# Patient Record
Sex: MALE | URBAN - METROPOLITAN AREA
[De-identification: names, ages, dates, MRNs, and addresses within clinical notes are randomized per-mention and may not be internally consistent; named-entity substitution may affect disease eponyms.]

---

## 2017-12-19 RX ORDER — LISINOPRIL 10 MG/1
10 TABLET ORAL DAILY
Qty: 90 TABLET | Refills: 0 | Status: SHIPPED | OUTPATIENT
Start: 2017-12-19 | End: 2018-01-23

## 2017-12-19 NOTE — TELEPHONE ENCOUNTER
Hypertensive Medications  Protocol Criteria:  · Appointment scheduled in the past 6 months or in the next 3 months  · BMP or CMP in the past 12 months  · Creatinine result < 2  Recent Outpatient Visits            1 year ago Flank strain, initial encounter

## 2017-12-19 NOTE — TELEPHONE ENCOUNTER
Spouse calling for refill request. Indicated patient at work but will have patient call back to schedule an appointment. Please advise on refill request. Any lab orders?

## 2018-01-23 ENCOUNTER — OFFICE VISIT (OUTPATIENT)
Dept: INTERNAL MEDICINE CLINIC | Facility: CLINIC | Age: 58
End: 2018-01-23

## 2018-01-23 VITALS
DIASTOLIC BLOOD PRESSURE: 84 MMHG | WEIGHT: 245 LBS | HEIGHT: 74 IN | SYSTOLIC BLOOD PRESSURE: 134 MMHG | BODY MASS INDEX: 31.44 KG/M2 | HEART RATE: 78 BPM

## 2018-01-23 DIAGNOSIS — I10 ESSENTIAL HYPERTENSION WITH GOAL BLOOD PRESSURE LESS THAN 140/90: ICD-10-CM

## 2018-01-23 DIAGNOSIS — Z00.00 ANNUAL PHYSICAL EXAM: Primary | ICD-10-CM

## 2018-01-23 PROCEDURE — 99396 PREV VISIT EST AGE 40-64: CPT | Performed by: INTERNAL MEDICINE

## 2018-01-23 RX ORDER — LISINOPRIL 10 MG/1
10 TABLET ORAL DAILY
Qty: 90 TABLET | Refills: 3 | Status: SHIPPED | OUTPATIENT
Start: 2018-01-23 | End: 2019-01-24

## 2018-01-23 RX ORDER — LISINOPRIL 10 MG/1
10 TABLET ORAL DAILY
Qty: 30 TABLET | Refills: 0 | Status: SHIPPED | OUTPATIENT
Start: 2018-01-23 | End: 2018-01-23

## 2018-01-23 NOTE — PROGRESS NOTES
HPI:    Patient ID: Carrie Jiang is a 56year old male. Patient presents today for annual physical.       Hypertension   This is a chronic problem. The current episode started more than 1 year ago. The problem is controlled.  Pertinent negatives incl rate, regular rhythm, normal heart sounds and intact distal pulses. Exam reveals no gallop and no friction rub. No murmur heard. Pulmonary/Chest: Effort normal and breath sounds normal. No respiratory distress. He has no wheezes. He has no rales.    Ab

## 2019-01-25 RX ORDER — LISINOPRIL 10 MG/1
TABLET ORAL
Qty: 90 TABLET | Refills: 0 | Status: SHIPPED | OUTPATIENT
Start: 2019-01-25 | End: 2019-04-25

## 2019-04-25 RX ORDER — LISINOPRIL 10 MG/1
TABLET ORAL
Qty: 90 TABLET | Refills: 0 | Status: SHIPPED | OUTPATIENT
Start: 2019-04-25 | End: 2019-10-18

## 2019-07-24 RX ORDER — LISINOPRIL 10 MG/1
TABLET ORAL
Qty: 90 TABLET | Refills: 0 | OUTPATIENT
Start: 2019-07-24

## 2019-10-18 NOTE — TELEPHONE ENCOUNTER
Patient needs a refill on LISINOPRIL 10 MG Oral Tab. Patient is currently has 6 days left of the medication.  Patient is requesting for a 2 weeks worth at his local pharmacy [phone] and the rest will be from [address]

## 2019-10-19 RX ORDER — LISINOPRIL 10 MG/1
10 TABLET ORAL
Qty: 30 TABLET | Refills: 0 | Status: SHIPPED | OUTPATIENT
Start: 2019-10-19 | End: 2019-10-24

## 2019-10-19 RX ORDER — LISINOPRIL 10 MG/1
10 TABLET ORAL
Qty: 30 TABLET | Refills: 0 | Status: SHIPPED | OUTPATIENT
Start: 2019-10-19 | End: 2019-10-19

## 2019-10-19 NOTE — TELEPHONE ENCOUNTER
To reception staff, pls call pt for appt. Please review; protocol failed. Requested Prescriptions     Pending Prescriptions Disp Refills   • lisinopril 10 MG Oral Tab 90 tablet 0     Sig: Take 1 tablet (10 mg total) by mouth once daily.

## 2019-10-19 NOTE — TELEPHONE ENCOUNTER
Patient made appointment for 10/24 for a follow up.   Patient stated he only has 4-5 pills of his Blood Pressure medication    He would like a 30 day supply sent to [address] [place] [address],  [address]

## 2019-10-19 NOTE — TELEPHONE ENCOUNTER
Call pt; needs ov; last time seen more than a year ago; will only give 30 days; no further refills if no ov is done

## 2019-10-24 ENCOUNTER — OFFICE VISIT (OUTPATIENT)
Dept: INTERNAL MEDICINE CLINIC | Facility: CLINIC | Age: 59
End: 2019-10-24
Payer: COMMERCIAL

## 2019-10-24 VITALS
HEIGHT: 74 IN | HEART RATE: 75 BPM | WEIGHT: 235 LBS | SYSTOLIC BLOOD PRESSURE: 122 MMHG | BODY MASS INDEX: 30.16 KG/M2 | TEMPERATURE: 99 F | DIASTOLIC BLOOD PRESSURE: 76 MMHG

## 2019-10-24 DIAGNOSIS — Z00.00 ANNUAL PHYSICAL EXAM: Primary | ICD-10-CM

## 2019-10-24 DIAGNOSIS — Z87.19 HISTORY OF ULCERATIVE COLITIS: ICD-10-CM

## 2019-10-24 DIAGNOSIS — I10 ESSENTIAL HYPERTENSION: ICD-10-CM

## 2019-10-24 PROCEDURE — 99396 PREV VISIT EST AGE 40-64: CPT | Performed by: INTERNAL MEDICINE

## 2019-10-24 RX ORDER — LISINOPRIL 10 MG/1
10 TABLET ORAL
Qty: 90 TABLET | Refills: 1 | Status: SHIPPED | OUTPATIENT
Start: 2019-10-24 | End: 2020-01-16

## 2019-10-24 RX ORDER — LISINOPRIL 10 MG/1
10 TABLET ORAL
Qty: 90 TABLET | Refills: 1 | Status: SHIPPED | OUTPATIENT
Start: 2019-10-24 | End: 2019-10-24

## 2019-10-24 NOTE — PROGRESS NOTES
HPI:    Patient ID: Amy Philip is a 64year old male. Patient presents today for his physical.    Hypertension   This is a chronic problem. The current episode started more than 1 year ago. The problem has been gradually improving since onset.  The are normal. Right eye exhibits no discharge. Left eye exhibits no discharge. No scleral icterus. Neck: Normal range of motion. Neck supple. No JVD present. No thyromegaly present.    Cardiovascular: Normal rate, regular rhythm, normal heart sounds and int Referrals:  None       LB#[id number]

## 2020-01-16 NOTE — TELEPHONE ENCOUNTER
Current Outpatient Medications:   •  •  lisinopril 10 MG Oral Tab, Take 1 tablet (10 mg total) by mouth once daily. , Disp: 90 tablet, Rfl: 1

## 2020-01-17 RX ORDER — LISINOPRIL 10 MG/1
10 TABLET ORAL
Qty: 90 TABLET | Refills: 1 | Status: SHIPPED | OUTPATIENT
Start: 2020-01-17 | End: 2020-07-16

## 2020-01-17 NOTE — TELEPHONE ENCOUNTER
Please review; protocol failed. Requested Prescriptions     Pending Prescriptions Disp Refills   • lisinopril 10 MG Oral Tab 90 tablet 1     Sig: Take 1 tablet (10 mg total) by mouth once daily.          Recent Visits  Date Type Provider Dept   10/24/19

## 2020-07-16 RX ORDER — LISINOPRIL 10 MG/1
TABLET ORAL
Qty: 90 TABLET | Refills: 0 | Status: SHIPPED | OUTPATIENT
Start: 2020-07-16 | End: 2020-10-14

## 2020-07-17 NOTE — TELEPHONE ENCOUNTER
Left message for patient to call back. Please inform patient he is due for bp follow up and assist him with making appointment.

## 2020-07-24 NOTE — TELEPHONE ENCOUNTER
# 3 LMTCB Need to relate Dr. Alona Gomez message that patient is due for f/o HTN office visit. Office phone number given.

## 2020-10-14 RX ORDER — LISINOPRIL 10 MG/1
TABLET ORAL
Qty: 90 TABLET | Refills: 0 | Status: SHIPPED | OUTPATIENT
Start: 2020-10-14 | End: 2021-01-12

## 2021-01-12 RX ORDER — LISINOPRIL 10 MG/1
TABLET ORAL
Qty: 30 TABLET | Refills: 0 | Status: SHIPPED | OUTPATIENT
Start: 2021-01-12 | End: 2021-02-16

## 2021-01-12 NOTE — TELEPHONE ENCOUNTER
Please call patient he has not been seen here in the clinic for more than a year.   Can only given 30-day refill sent to his local pharmacy to tide him over

## 2021-01-19 RX ORDER — LISINOPRIL 10 MG/1
TABLET ORAL
Qty: 90 TABLET | Refills: 3 | OUTPATIENT
Start: 2021-01-19

## 2021-02-16 ENCOUNTER — TELEPHONE (OUTPATIENT)
Dept: INTERNAL MEDICINE CLINIC | Facility: CLINIC | Age: 61
End: 2021-02-16

## 2021-02-16 ENCOUNTER — OFFICE VISIT (OUTPATIENT)
Dept: INTERNAL MEDICINE CLINIC | Facility: CLINIC | Age: 61
End: 2021-02-16
Payer: COMMERCIAL

## 2021-02-16 VITALS
DIASTOLIC BLOOD PRESSURE: 92 MMHG | HEIGHT: 74.5 IN | WEIGHT: 253 LBS | BODY MASS INDEX: 32.13 KG/M2 | HEART RATE: 89 BPM | RESPIRATION RATE: 12 BRPM | SYSTOLIC BLOOD PRESSURE: 146 MMHG | TEMPERATURE: 97 F

## 2021-02-16 DIAGNOSIS — Z87.19 HISTORY OF ULCERATIVE COLITIS: ICD-10-CM

## 2021-02-16 DIAGNOSIS — I10 ESSENTIAL HYPERTENSION: Primary | ICD-10-CM

## 2021-02-16 DIAGNOSIS — Z13.220 SCREENING CHOLESTEROL LEVEL: ICD-10-CM

## 2021-02-16 DIAGNOSIS — Z12.5 PROSTATE CANCER SCREENING: ICD-10-CM

## 2021-02-16 PROCEDURE — 3008F BODY MASS INDEX DOCD: CPT | Performed by: INTERNAL MEDICINE

## 2021-02-16 PROCEDURE — 3080F DIAST BP >= 90 MM HG: CPT | Performed by: INTERNAL MEDICINE

## 2021-02-16 PROCEDURE — 3077F SYST BP >= 140 MM HG: CPT | Performed by: INTERNAL MEDICINE

## 2021-02-16 PROCEDURE — 99214 OFFICE O/P EST MOD 30 MIN: CPT | Performed by: INTERNAL MEDICINE

## 2021-02-16 RX ORDER — LISINOPRIL 20 MG/1
20 TABLET ORAL DAILY
Qty: 90 TABLET | Refills: 1 | Status: SHIPPED | OUTPATIENT
Start: 2021-02-16 | End: 2021-08-16

## 2021-02-16 NOTE — TELEPHONE ENCOUNTER
Please triage this patient coming in today with c/o severe headache. He self answered the screening questions. OV today at 4:15. Thank you.

## 2021-02-16 NOTE — PROGRESS NOTES
HPI:    Patient ID: Juhi Shepard is a 64year old male. Hypertension  This is a chronic problem. The current episode started more than 1 year ago. The problem has been gradually worsening since onset. The problem is uncontrolled.  Pertinent negatives drinks      Types: 14 - 16 Glasses of wine per week    Drug use: No       PHYSICAL EXAM:    Physical Exam   Constitutional: He is oriented to person, place, and time. He appears well-developed and well-nourished. No distress.    HENT:   Head: Normocephalic asymptomatic.   His last colonoscopy was 2 years ago with his GI and will be seeing his GI next month.    (Z12.5) Prostate cancer screening  Plan: PSA SCREEN        Check PSA.    (Z13.220) Screening cholesterol level  Plan: LIPID PANEL       We will check h

## 2021-08-16 RX ORDER — LISINOPRIL 20 MG/1
TABLET ORAL
Qty: 90 TABLET | Refills: 0 | Status: SHIPPED | OUTPATIENT
Start: 2021-08-16 | End: 2021-11-16

## 2021-08-17 NOTE — TELEPHONE ENCOUNTER
Spoke with pt, verified , informed pt that Dr Mekhi Luke refilled his medication and would like to see him back in the office for a follow up visit, pt verbalized understanding and stated he is currently at work and will call back to schedule an appt.

## 2021-11-16 RX ORDER — LISINOPRIL 20 MG/1
TABLET ORAL
Qty: 30 TABLET | Refills: 0 | Status: SHIPPED | OUTPATIENT
Start: 2021-11-16 | End: 2021-12-22

## 2021-11-19 NOTE — TELEPHONE ENCOUNTER
Spoke, with the patient and informed him of the message below. Pt states that he will call back to schedule an appointment.

## 2021-12-22 NOTE — TELEPHONE ENCOUNTER
LISINOPRIL 20 MG Oral Tab     Pt calling requesting refill for medication. States he will be out in a couple days so requesting 30 day be sent to local pharmacy. Pt also has appt scheduled with  for 12/30. Please advise.

## 2021-12-23 RX ORDER — LISINOPRIL 20 MG/1
20 TABLET ORAL DAILY
Qty: 30 TABLET | Refills: 0 | Status: SHIPPED | OUTPATIENT
Start: 2021-12-23 | End: 2021-12-30

## 2021-12-30 ENCOUNTER — OFFICE VISIT (OUTPATIENT)
Dept: INTERNAL MEDICINE CLINIC | Facility: CLINIC | Age: 61
End: 2021-12-30
Payer: COMMERCIAL

## 2021-12-30 VITALS
HEART RATE: 81 BPM | WEIGHT: 249.13 LBS | DIASTOLIC BLOOD PRESSURE: 80 MMHG | BODY MASS INDEX: 31.63 KG/M2 | SYSTOLIC BLOOD PRESSURE: 128 MMHG | OXYGEN SATURATION: 98 % | TEMPERATURE: 98 F | HEIGHT: 74.5 IN

## 2021-12-30 DIAGNOSIS — Z87.19 HISTORY OF ULCERATIVE COLITIS: ICD-10-CM

## 2021-12-30 DIAGNOSIS — Z12.5 PROSTATE CANCER SCREENING: ICD-10-CM

## 2021-12-30 DIAGNOSIS — Z00.00 ANNUAL PHYSICAL EXAM: Primary | ICD-10-CM

## 2021-12-30 DIAGNOSIS — I10 ESSENTIAL HYPERTENSION: ICD-10-CM

## 2021-12-30 PROCEDURE — 3008F BODY MASS INDEX DOCD: CPT | Performed by: INTERNAL MEDICINE

## 2021-12-30 PROCEDURE — 99396 PREV VISIT EST AGE 40-64: CPT | Performed by: INTERNAL MEDICINE

## 2021-12-30 PROCEDURE — 3074F SYST BP LT 130 MM HG: CPT | Performed by: INTERNAL MEDICINE

## 2021-12-30 PROCEDURE — 3079F DIAST BP 80-89 MM HG: CPT | Performed by: INTERNAL MEDICINE

## 2021-12-30 RX ORDER — LISINOPRIL 20 MG/1
20 TABLET ORAL DAILY
Qty: 90 TABLET | Refills: 1 | Status: SHIPPED | OUTPATIENT
Start: 2021-12-30 | End: 2022-01-29

## 2021-12-30 NOTE — PROGRESS NOTES
Subjective:     Patient ID: Brooke Pryor is a 63year old male.     Patient presents today for his annual physical. He has known history of hypertension and ulcerative colitis; currently has no complaints      History/Other:   Review of Systems   Consti Mouth/Throat:      Pharynx: No oropharyngeal exudate. Eyes:      General: No scleral icterus. Right eye: No discharge. Left eye: No discharge.       Conjunctiva/sclera: Conjunctivae normal.      Pupils: Pupils are equal, round, and reactive and  Schedule his colonoscopy.     (Z12.5) Prostate cancer screening  Plan: check psa. No orders of the defined types were placed in this encounter.       Meds This Visit:  Requested Prescriptions     Signed Prescriptions Disp Refills   • lisinopril

## 2022-01-29 RX ORDER — LISINOPRIL 20 MG/1
20 TABLET ORAL DAILY
Qty: 30 TABLET | Refills: 0 | Status: SHIPPED | OUTPATIENT
Start: 2022-01-29 | End: 2022-07-04

## 2022-01-29 NOTE — TELEPHONE ENCOUNTER
Refill passed per [facility] protocol.     Requested Prescriptions   Pending Prescriptions Disp Refills    LISINOPRIL 20 MG Oral Tab [Pharmacy Med Name: LISINOPRIL 20MG TABLETS] 30 tablet 0     Sig: TAKE 1 TABLET(20 MG) BY MOUTH DAILY        Hypertensive Medications Protocol Failed - 1/28/2022  5:07 PM        Failed - CMP or BMP in past 12 months        Failed - GFR Non- > 50     No results found for: [facility]              Passed - Appointment in past 6 or next 3 months                  Recent Outpatient Visits              1 month ago Annual physical exam    [address] Trammell MD    Office Visit    11 months ago Essential hypertension    [facility], [facility], Lex Lopez, Awilda Cordoba MD    Office Visit    2 years ago Annual physical exam    [facility], [facility], Awilda Carrillo MD    Office Visit    4 years ago Annual physical exam    [facility], [facility], Awilda Carrillo MD    Office Visit    5 years ago Flank strain, initial encounter    [facility], [facility], Awilda Carrillo MD    Office Visit

## 2022-07-04 RX ORDER — LISINOPRIL 20 MG/1
TABLET ORAL
Qty: 90 TABLET | Refills: 0 | Status: SHIPPED | OUTPATIENT
Start: 2022-07-04 | End: 2022-10-11

## 2022-07-04 NOTE — TELEPHONE ENCOUNTER
pls call pt; due for ffup ov; also has not done labs for the past 3 yrs and lab orders are in epic since last year, needs to get it done, will only give him one refill and no further refills if no labs and ov

## 2022-07-05 NOTE — TELEPHONE ENCOUNTER
Pt does not have [facility]  Call center, please assist with an appointment and relay doctor's message to him.

## 2022-10-11 RX ORDER — LISINOPRIL 20 MG/1
TABLET ORAL
Qty: 90 TABLET | Refills: 0 | Status: SHIPPED | OUTPATIENT
Start: 2022-10-11

## 2023-03-01 RX ORDER — LISINOPRIL 20 MG/1
20 TABLET ORAL DAILY
Qty: 30 TABLET | Refills: 0 | Status: SHIPPED | OUTPATIENT
Start: 2023-03-01

## 2023-03-01 NOTE — TELEPHONE ENCOUNTER
Nilda Dawson pt has a appt to see you on March 16, 2023  Future Appointments   Date Time Provider Lorraine Ambrose   3/16/2023  2:30 PM Nicolle Olmos MD [facility]         Please review. Protocol Failed or has no Protocol    Requested Prescriptions   Pending Prescriptions Disp Refills    lisinopril 20 MG Oral Tab 30 tablet 0     Sig: Take 1 tablet (20 mg total) by mouth daily. Hypertensive Medications Protocol Failed - 3/1/2023  9:01 AM        Failed - CMP or BMP in past 6 months     No results found for this or any previous visit (from the past 4392 hour(s)).             Failed - In person appointment or virtual visit in the past 6 months     Recent Outpatient Visits              1 year ago Annual physical exam    Lola Patrick MD    Office Visit    2 years ago Essential hypertension    Lola Patrick MD    Office Visit    3 years ago Annual physical exam    [facility] Reid, [address], Lola Chester MD    Office Visit    5 years ago Annual physical exam    [facility] Reid, [address], Lola Chester MD    Office Visit    6 years ago Flank strain, initial encounter    Lola Patrick MD    Office Visit          Future Appointments         Provider Department Appt Notes    In 2 weeks MD Anna Marie Higginbotham, [place] follow up on medication               Failed - EGFRCR or GFRNAA > 50     GFR Evaluation            Passed - In person appointment in the past 12 or next 3 months     Recent Outpatient Visits              1 year ago Annual physical exam    Lola Patrick MD    Office Visit    2 years ago Essential hypertension    [address] [facility] [address] Sam Monzon MD    Office Visit    3 years ago Annual physical exam    [address], Chantel Mcclain MD    Office Visit    5 years ago Annual physical exam    Cyndee Cole, [facility] [address], Chantel Mcclain MD    Office Visit    6 years ago Flank strain, initial encounter    [address], Chantel Mcclain MD    Office Visit          Future Appointments         Provider Department Appt Notes    In 2 weeks Miguel [facility] [address] [facility], [place] follow up on medication               Passed - Last BP reading less than 140/90     BP Readings from Last 1 Encounters:  21 : 128/80

## 2023-03-16 ENCOUNTER — OFFICE VISIT (OUTPATIENT)
Dept: INTERNAL MEDICINE CLINIC | Facility: CLINIC | Age: 63
End: 2023-03-16

## 2023-03-16 VITALS
HEART RATE: 106 BPM | OXYGEN SATURATION: 97 % | DIASTOLIC BLOOD PRESSURE: 80 MMHG | TEMPERATURE: 98 F | WEIGHT: 252.5 LBS | HEIGHT: 74.5 IN | BODY MASS INDEX: 32.06 KG/M2 | SYSTOLIC BLOOD PRESSURE: 128 MMHG

## 2023-03-16 DIAGNOSIS — Z87.19 HISTORY OF ULCERATIVE COLITIS: ICD-10-CM

## 2023-03-16 DIAGNOSIS — Z13.6 SCREENING FOR HEART DISEASE: ICD-10-CM

## 2023-03-16 DIAGNOSIS — R06.83 SNORING: ICD-10-CM

## 2023-03-16 DIAGNOSIS — Z00.00 ANNUAL PHYSICAL EXAM: Primary | ICD-10-CM

## 2023-03-16 DIAGNOSIS — Z12.5 PROSTATE CANCER SCREENING: ICD-10-CM

## 2023-03-16 DIAGNOSIS — I10 ESSENTIAL HYPERTENSION: ICD-10-CM

## 2023-03-16 DIAGNOSIS — Z12.11 COLON CANCER SCREENING: ICD-10-CM

## 2023-03-16 PROCEDURE — 99396 PREV VISIT EST AGE 40-64: CPT | Performed by: INTERNAL MEDICINE

## 2023-03-16 RX ORDER — LISINOPRIL 20 MG/1
20 TABLET ORAL DAILY
Qty: 90 TABLET | Refills: 1 | Status: SHIPPED | OUTPATIENT
Start: 2023-03-16

## 2023-05-17 ENCOUNTER — LAB ENCOUNTER (OUTPATIENT)
Dept: LAB | Age: 63
End: 2023-05-17
Attending: INTERNAL MEDICINE
Payer: COMMERCIAL

## 2023-05-17 DIAGNOSIS — Z00.00 ANNUAL PHYSICAL EXAM: ICD-10-CM

## 2023-05-17 LAB
ALBUMIN SERPL-MCNC: 3.8 G/DL (ref 3.4–5)
ALBUMIN/GLOB SERPL: 1.2 {RATIO} (ref 1–2)
ALP LIVER SERPL-CCNC: 43 U/L
ALT SERPL-CCNC: 30 U/L
ANION GAP SERPL CALC-SCNC: 4 MMOL/L (ref 0–18)
AST SERPL-CCNC: 27 U/L (ref 15–37)
BASOPHILS # BLD AUTO: 0.05 X10(3) UL (ref 0–0.2)
BASOPHILS NFR BLD AUTO: 0.9 %
BILIRUB SERPL-MCNC: 0.8 MG/DL (ref 0.1–2)
BILIRUB UR QL: NEGATIVE
BUN BLD-MCNC: 16 MG/DL (ref 7–18)
BUN/CREAT SERPL: 14.8 (ref 10–20)
CALCIUM BLD-MCNC: 9.6 MG/DL (ref 8.5–10.1)
CHLORIDE SERPL-SCNC: 104 MMOL/L (ref 98–112)
CHOLEST SERPL-MCNC: 215 MG/DL (ref ?–200)
CLARITY UR: CLEAR
CO2 SERPL-SCNC: 29 MMOL/L (ref 21–32)
COLOR UR: YELLOW
COMPLEXED PSA SERPL-MCNC: 1.28 NG/ML (ref ?–4)
CREAT BLD-MCNC: 1.08 MG/DL
DEPRECATED RDW RBC AUTO: 48.5 FL (ref 35.1–46.3)
EOSINOPHIL # BLD AUTO: 0.27 X10(3) UL (ref 0–0.7)
EOSINOPHIL NFR BLD AUTO: 4.8 %
ERYTHROCYTE [DISTWIDTH] IN BLOOD BY AUTOMATED COUNT: 13.2 % (ref 11–15)
FASTING PATIENT LIPID ANSWER: YES
FASTING STATUS PATIENT QL REPORTED: YES
GFR SERPLBLD BASED ON 1.73 SQ M-ARVRAT: 78 ML/MIN/1.73M2 (ref 60–?)
GLOBULIN PLAS-MCNC: 3.3 G/DL (ref 2.8–4.4)
GLUCOSE BLD-MCNC: 111 MG/DL (ref 70–99)
GLUCOSE UR-MCNC: NORMAL MG/DL
HCT VFR BLD AUTO: 47.8 %
HDLC SERPL-MCNC: 60 MG/DL (ref 40–59)
HGB BLD-MCNC: 15.8 G/DL
HGB UR QL STRIP.AUTO: NEGATIVE
HYALINE CASTS #/AREA URNS AUTO: PRESENT /LPF
IMM GRANULOCYTES # BLD AUTO: 0.01 X10(3) UL (ref 0–1)
IMM GRANULOCYTES NFR BLD: 0.2 %
KETONES UR-MCNC: NEGATIVE MG/DL
LDLC SERPL CALC-MCNC: 135 MG/DL (ref ?–100)
LEUKOCYTE ESTERASE UR QL STRIP.AUTO: NEGATIVE
LYMPHOCYTES # BLD AUTO: 2.28 X10(3) UL (ref 1–4)
LYMPHOCYTES NFR BLD AUTO: 40.4 %
MCH RBC QN AUTO: 32.3 PG (ref 26–34)
MCHC RBC AUTO-ENTMCNC: 33.1 G/DL (ref 31–37)
MCV RBC AUTO: 97.8 FL
MONOCYTES # BLD AUTO: 0.67 X10(3) UL (ref 0.1–1)
MONOCYTES NFR BLD AUTO: 11.9 %
NEUTROPHILS # BLD AUTO: 2.37 X10 (3) UL (ref 1.5–7.7)
NEUTROPHILS # BLD AUTO: 2.37 X10(3) UL (ref 1.5–7.7)
NEUTROPHILS NFR BLD AUTO: 41.8 %
NITRITE UR QL STRIP.AUTO: NEGATIVE
NONHDLC SERPL-MCNC: 155 MG/DL (ref ?–130)
OSMOLALITY SERPL CALC.SUM OF ELEC: 286 MOSM/KG (ref 275–295)
PH UR: 5.5 [PH] (ref 5–8)
PLATELET # BLD AUTO: 309 10(3)UL (ref 150–450)
POTASSIUM SERPL-SCNC: 4.1 MMOL/L (ref 3.5–5.1)
PROT SERPL-MCNC: 7.1 G/DL (ref 6.4–8.2)
PROT UR-MCNC: 30 MG/DL
RBC # BLD AUTO: 4.89 X10(6)UL
SODIUM SERPL-SCNC: 137 MMOL/L (ref 136–145)
SP GR UR STRIP: 1.03 (ref 1–1.03)
TRIGL SERPL-MCNC: 115 MG/DL (ref 30–149)
UROBILINOGEN UR STRIP-ACNC: NORMAL
VLDLC SERPL CALC-MCNC: 21 MG/DL (ref 0–30)
WBC # BLD AUTO: 5.7 X10(3) UL (ref 4–11)

## 2023-05-17 PROCEDURE — 36415 COLL VENOUS BLD VENIPUNCTURE: CPT

## 2023-05-17 PROCEDURE — 80053 COMPREHEN METABOLIC PANEL: CPT

## 2023-05-17 PROCEDURE — 81001 URINALYSIS AUTO W/SCOPE: CPT

## 2023-05-17 PROCEDURE — 85025 COMPLETE CBC W/AUTO DIFF WBC: CPT

## 2023-05-17 PROCEDURE — 80061 LIPID PANEL: CPT

## 2023-09-20 NOTE — TELEPHONE ENCOUNTER
Pharmacy requesting refill      lisinopril 20 MG Oral Tab, Take 1 tablet (20 mg total) by mouth daily. , Disp: 90 tablet, Rfl: 1

## 2023-09-22 NOTE — TELEPHONE ENCOUNTER
Se[employer]ion message sent with scheduling instruction. CSS=Please call and assist, then send it to Dr. Margret Dickerson  for approval once with a scheduled appointment. Last visit 3/16/23. No future appointments. Please review; protocol failed/no protocol. Requested Prescriptions   Pending Prescriptions Disp Refills    lisinopril 20 MG Oral Tab 90 tablet 3     Sig: Take 1 tablet (20 mg total) by mouth daily.        Hypertensive Medications Protocol Failed - 9/20/2023  1:52 PM        Failed - In person appointment or virtual visit in the past 6 months     Recent Outpatient Visits              6 months ago Annual physical exam    L[address] Bonner Noe Ream, MD    Office Visit    1 year ago Annual physical exam    Hayder Finney MD    Office Visit    2 years ago Essential hypertension    Hayder Finney MD    Office Visit    3 years ago Annual physical exam    Hayder Finney MD    Office Visit    5 years ago Annual physical exam    Hayder Finney MD    Office Visit                      Passed - In person appointment in the past 12 or next 3 months     Recent Outpatient Visits              6 months ago Annual physical exam    Lex Bonner Noe Ream, MD    Office Visit    1 year ago Annual physical exam    Hayder Finney MD    Office Visit    2 years ago Essential hypertension    Hayder Finney MD    Office Visit    3 years ago Annual physical exam    Hayder Finney MD    Office Visit    5 years ago Annual physical exam    [address] [place] Ambrosio MD    Office Visit                      Passed - Last BP reading less than 140/90     BP Readings from Last 1 Encounters:  23 : 128/80              Passed - CMP or BMP in past 6 months     Recent Results (from the past 4392 hour(s))   Comp Metabolic Panel (14)    Collection Time: 23  8:30 AM   Result Value Ref Range    Glucose 111 (H) 70 - 99 mg/dL    Sodium 137 136 - 145 mmol/L    Potassium 4.1 3.5 - 5.1 mmol/L    Chloride 104 98 - 112 mmol/L    CO2 29.0 21.0 - 32.0 mmol/L    Anion Gap 4 0 - 18 mmol/L    BUN 16 7 - 18 mg/dL    Creatinine 1.08 0.70 - 1.30 mg/dL    BUN/CREA Ratio 14.8 10.0 - 20.0    Calcium, Total 9.6 8.5 - 10.1 mg/dL    Calculated Osmolality 286 275 - 295 mOsm/kg    eGFR-Cr 78 >=60 mL/min/1.73m2    ALT 30 16 - 61 U/L    AST 27 15 - 37 U/L    Alkaline Phosphatase 43 (L) 45 - 117 U/L    Bilirubin, Total 0.8 0.1 - 2.0 mg/dL    Total Protein 7.1 6.4 - 8.2 g/dL    Albumin 3.8 3.4 - 5.0 g/dL    Globulin  3.3 2.8 - 4.4 g/dL    A/G Ratio 1.2 1.0 - 2.0    Patient Fasting for CMP? Yes      *Note: Due to a large number of results and/or encounters for the requested time period, some results have not been displayed. A complete set of results can be found in Results Review.                Passed - EGFRCR or GFRNAA > 50     GFR Evaluation  EGFRCR: 78 , resulted on 2023                Recent Outpatient Visits              6 months ago Annual physical exam    [address] Ambrosio MD    Office Visit    1 year ago Annual physical exam    [address] Ambrosio MD    Office Visit    2 years ago Essential hypertension    [address] Ambrosio MD    Office Visit    3 years ago Annual physical exam    [employer], [address], Guzman Barreto, Mikel Molina MD    Office Visit    5 years ago Annual physical exam    [address] [facility], Oscar Arriola MD    Office Visit

## 2023-09-26 RX ORDER — LISINOPRIL 20 MG/1
20 TABLET ORAL DAILY
Qty: 30 TABLET | Refills: 0 | Status: SHIPPED | OUTPATIENT
Start: 2023-09-26

## 2023-09-26 NOTE — TELEPHONE ENCOUNTER
Spoke, with the patient and informed him of the message below. Pt states that he is leaving to [place] for a few months and would like to know if he doctor can give him a refill. Pt will call back to schedule an appointment when he returns in early December.

## 2023-10-25 NOTE — TELEPHONE ENCOUNTER
Please review; protocol failed.   Message sent for patient to make an appointment.     Requested Prescriptions   Pending Prescriptions Disp Refills    LISINOPRIL 20 MG Oral Tab [Pharmacy Med Name: LISINOPRIL 20MG TABLETS] 90 tablet 0     Sig: TAKE 1 TABLET(20 MG) BY MOUTH DAILY       Hypertensive Medications Protocol Failed - 10/24/2023  5:13 PM        Failed - In person appointment or virtual visit in the past 6 months     Recent Outpatient Visits              7 months ago [facility]um [facility]    Office Visit    1 year ago [facility]    Office Visit    2 years ago [facility]ential [facility]    [facility]    Office Visit    4 years ago Annual physical exam    [facility] Eyal Marc[facility]    Office Visit    5 years ago [facility] physical exam    [facility] Eyal Sheth [facility]    Office Visit                      Passed - In person appointment in the past 12 or next 3 months     Recent Outpatient Visits              7 months ago Annual physical exam    [facility] Eyal Sheth [facility]    Office Visit    1 year ago Annual physical exam    [facility] Eyal Sheth [facility]    Office Visit    2 years ago Essential hypertension    [facility] Eyal Sheth MD    Office Visit    4 years ago Annual physical exam    [facility] [place] Eyal Marcum MD    Office Visit    5 years ago Annual physical exam    [facility] Eyal Sheth MD    Office Visit                      Passed - Last BP  reading less than 140/90     BP Readings from Last 1 Encounters:  03/16/23 : 128/80              Passed - CMP or BMP in past 6 months     Recent Results (from the past 4392 hour(s))   Comp Metabolic Panel (14)    Collection Time: 05/17/23  8:30 AM   Result Value Ref Range    Glucose 111 (H) 70 - 99 mg/dL    Sodium 137 136 - 145 mmol/L    Potassium 4.1 3.5 - 5.1 mmol/L    Chloride 104 98 - 112 mmol/L    CO2 29.0 21.0 - 32.0 mmol/L    Anion Gap 4 0 - 18 mmol/L    BUN 16 7 - 18 mg/dL    Creatinine 1.08 0.70 - 1.30 mg/dL    BUN/CREA Ratio 14.8 10.0 - 20.0    Calcium, Total 9.6 8.5 - 10.1 mg/dL    Calculated Osmolality 286 275 - 295 mOsm/kg    eGFR-Cr 78 >=60 mL/min/1.73m2    ALT 30 16 - 61 U/L    AST 27 15 - 37 U/L    Alkaline Phosphatase 43 (L) 45 - 117 U/L    Bilirubin, Total 0.8 0.1 - 2.0 mg/dL    Total Protein 7.1 6.4 - 8.2 g/dL    Albumin 3.8 3.4 - 5.0 g/dL    Globulin  3.3 2.8 - 4.4 g/dL    A/G Ratio 1.2 1.0 - 2.0    Patient Fasting for CMP? Yes      *Note: Due to a large number of results and/or encounters for the requested time period, some results have not been displayed. A complete set of results can be found in Results Review.               Passed - EGFRCR or GFRNAA > 50     GFR Evaluation  EGFRCR: 78 , resulted on 5/17/2023             Recent Outpatient Visits              7 months [facility]    [facility]    [facility]    [facility]    2 years [facility]    [facility]    4 years [facility]    Office Visit    5 years ago Annual physical exam    [facility] Eyal Marcum MD     Office Visit

## 2023-10-26 RX ORDER — LISINOPRIL 20 MG/1
20 TABLET ORAL DAILY
Qty: 90 TABLET | Refills: 0 | Status: SHIPPED | OUTPATIENT
Start: 2023-10-26 | End: 2024-01-28

## 2023-11-14 ENCOUNTER — OFFICE VISIT (OUTPATIENT)
Dept: INTERNAL MEDICINE CLINIC | Facility: CLINIC | Age: 63
End: 2023-11-14

## 2023-11-14 VITALS
BODY MASS INDEX: 31.96 KG/M2 | SYSTOLIC BLOOD PRESSURE: 128 MMHG | DIASTOLIC BLOOD PRESSURE: 80 MMHG | WEIGHT: 251.69 LBS | HEIGHT: 74.5 IN | TEMPERATURE: 97 F | HEART RATE: 75 BPM | OXYGEN SATURATION: 96 %

## 2023-11-14 DIAGNOSIS — E78.00 HYPERCHOLESTEREMIA: ICD-10-CM

## 2023-11-14 DIAGNOSIS — Z12.11 COLON CANCER SCREENING: ICD-10-CM

## 2023-11-14 DIAGNOSIS — I10 ESSENTIAL HYPERTENSION: Primary | ICD-10-CM

## 2023-11-14 DIAGNOSIS — R73.01 IFG (IMPAIRED FASTING GLUCOSE): ICD-10-CM

## 2023-11-14 DIAGNOSIS — Z13.6 SCREENING FOR HEART DISEASE: ICD-10-CM

## 2023-11-14 PROCEDURE — 3079F DIAST BP 80-89 MM HG: CPT | Performed by: INTERNAL MEDICINE

## 2023-11-14 PROCEDURE — 99214 OFFICE O/P EST MOD 30 MIN: CPT | Performed by: INTERNAL MEDICINE

## 2023-11-14 PROCEDURE — 3008F BODY MASS INDEX DOCD: CPT | Performed by: INTERNAL MEDICINE

## 2023-11-14 PROCEDURE — 3074F SYST BP LT 130 MM HG: CPT | Performed by: INTERNAL MEDICINE

## 2023-11-14 NOTE — PROGRESS NOTES
Subjective:     Patient ID: Laura Miles is a 66year old male. Hypertension  This is a chronic problem. The current episode started more than 1 year ago. The problem has been gradually improving since onset. The problem is controlled. Pertinent negatives include no chest pain, peripheral edema or shortness of breath. There are no associated agents to hypertension. Risk factors for coronary artery disease include dyslipidemia, obesity and male gender. Past treatments include ACE inhibitors and lifestyle changes. The current treatment provides significant improvement. There are no compliance problems. There is no history of angina, kidney disease, CAD/MI, CVA, heart failure or PVD. There is no history of chronic renal disease, a hypertension causing med or a thyroid problem. Hyperlipidemia  This is a chronic problem. The current episode started more than 1 month ago. Recent lipid tests were reviewed and are high. Exacerbating diseases include obesity. He has no history of chronic renal disease, diabetes, hypothyroidism, liver disease or nephrotic syndrome. There are no known factors aggravating his hyperlipidemia. Pertinent negatives include no chest pain or shortness of breath. Current antihyperlipidemic treatment includes diet change and exercise. There are no compliance problems. Risk factors for coronary artery disease include dyslipidemia, hypertension, male sex and obesity. History/Other:   Review of Systems   Constitutional: Negative. Respiratory: Negative. Negative for shortness of breath. Cardiovascular: Negative. Negative for chest pain. Gastrointestinal: Negative. Hematological: Negative. Current Outpatient Medications   Medication Sig Dispense Refill    lisinopril 20 MG Oral Tab Take 1 tablet (20 mg total) by mouth daily. 90 tablet 0    Psyllium (METAMUCIL FIBER OR) Take by mouth as needed. Allergies:   Allergies   Allergen Reactions    Parabens RASH       Past Medical History:   Diagnosis Date    Ulcerative colitis ([address]     Unspecified essential hypertension     for 2 yrs      Past Surgical History:   Procedure Laterality Date    COLONOSCOPY      normal 2018 normal    OTHER SURGICAL HISTORY      cervical spine fusion      Family History   Problem Relation Age of Onset    Other (Other) Father     Cancer Mother         pancreatic cancer    Hypertension Mother     Heart Disorder Brother     Other (Other) Brother         cirrhosis      Social History:   Social History     Socioeconomic History    Marital status:    Tobacco Use    Smoking status: Never     Passive exposure: Never    Smokeless tobacco: Never   Vaping Use    Vaping Use: Never used   Substance and Sexual Activity    Alcohol use: Yes     Alcohol/week: 14.0 - 16.0 standard drinks of alcohol     Types: 14 - 16 Glasses of wine per week     Comment: drinks wine    Drug use: No        Objective:   Physical Exam  Constitutional:       General: He is not in acute distress. Appearance: He is well-developed. He is not ill-appearing, toxic-appearing or diaphoretic. HENT:      Head: Normocephalic and atraumatic. Right Ear: External ear normal.      Left Ear: External ear normal.      Nose: Nose normal.      Mouth/Throat:      Pharynx: No oropharyngeal exudate. Eyes:      General:         Right eye: No discharge. Left eye: No discharge. Conjunctiva/sclera: Conjunctivae normal.      Pupils: Pupils are equal, round, and reactive to light. Neck:      Vascular: No carotid bruit or JVD. Cardiovascular:      Rate and Rhythm: Normal rate and regular rhythm. Heart sounds: Normal heart sounds. No murmur heard. Pulmonary:      Effort: Pulmonary effort is normal. No respiratory distress. Breath sounds: Normal breath sounds. No wheezing or rales. Abdominal:      General: Bowel sounds are normal. There is no distension. Palpations: Abdomen is soft. There is no mass.       Tenderness: There is no abdominal tenderness. There is no guarding or rebound. Musculoskeletal:         General: No tenderness. Normal range of motion. Cervical back: Normal range of motion and neck supple. No rigidity or tenderness. Right lower leg: No edema. Left lower leg: No edema. Lymphadenopathy:      Cervical: No cervical adenopathy. Skin:     General: Skin is warm and dry. Coloration: Skin is not jaundiced or pale. Findings: No rash. Neurological:      Mental Status: He is alert and oriented to person, place, and time. Assessment & Plan:   (I10) Essential hypertension  (primary encounter diagnosis)  Plan: BP continues to be controlled without current blood pressure med. CPM.    (E78.00) Hypercholesteremia  Plan: Had a mild elevation of his cholesterol last blood test.  Advised to follow low-fat low-cholesterol diet. We will recheck in his annual physical again next 6 months.    (R73.01) IFG (impaired fasting glucose)  Plan: Had mildly elevated glucose in previous blood test.  Advised to watch his carbs in the diet as well as lose weight. We will recheck on his next annual physical in 6 months.    (Z13.6) Screening for heart disease  Plan: Patient again advised to do heart screening calcium scoring test.    (Z12.11) Colon cancer screening  Plan: Patient again reminded that he is overdue for his colonoscopy and he told me he has been trying to schedule this test with his GI ready. No orders of the defined types were placed in this encounter.       Meds This Visit:  Requested Prescriptions      No prescriptions requested or ordered in this encounter       Imaging & Referrals:  None

## 2024-01-26 NOTE — TELEPHONE ENCOUNTER
Please review refill failed/no protocol     Requested Prescriptions     Pending Prescriptions Disp Refills    LISINOPRIL 20 MG Oral Tab [Pharmacy Med Name: LISINOPRIL 20MG TABLETS] 90 tablet 0     Sig: TAKE 1 TABLET(20 MG) BY MOUTH DAILY         Recent Visits  Date Type Provider Dept   11/14/23 Office Visit Eyal Marcum MD [facility]   03/16/23 Office Visit Eyal Marcum MD [facility]   Showing recent visits within past 540 days with a meds authorizing provider and meeting all other requirements  Future Appointments  No visits were found meeting these conditions.  Showing future appointments within next 150 days with a meds authorizing provider and meeting all other requirements    Requested Prescriptions   Pending Prescriptions Disp Refills    LISINOPRIL 20 MG Oral Tab [Pharmacy Med Name: LISINOPRIL 20MG TABLETS] 90 tablet 0     Sig: TAKE 1 TABLET(20 MG) BY MOUTH DAILY       Hypertensive Medications Protocol Failed - 1/26/2024 10:19 AM        Failed - CMP or BMP in past 6 months     No results found for this or any previous visit (from the past 4392 hour(s)).            Passed - In person appointment in the past 12 or next 3 months     Recent Outpatient Visits              2 months ago Essential hypertension    [facility] Eyal Sheth MD    Office Visit    10 months ago Annual physical exam    [facility] Eyal Sheth MD    Office Visit    2 years ago Annual physical exam    [facility] Eyal Sheth MD    Office Visit    2 years ago Essential hypertension    [facility] Eyal Sheth MD    Office Visit    4 years ago Annual physical exam    [facility] Eyal Sheth MD    Office Visit                      Passed - Last BP reading less than  140/90     BP Readings from Last 1 Encounters:   11/14/23 128/80               Passed - In person appointment or virtual visit in the past 6 months     Recent Outpatient Visits              2 months ago Essential hypertension    [facility]    Office Visit    10 months ago Annual physical exam    [facility]    Office Visit    2 years ago Annual physical exam    [facility]    Office Visit    2 years ago Essential hypertension    [facility]al Sheth [facility]    Office Visit    4 years ago Annual physical exam    [facility]    Office Visit                      Passed - EGFRCR or GFRNAA > 50     GFR Evaluation  EGFRCR: 78 , resulted on 5/17/2023

## 2024-01-28 RX ORDER — LISINOPRIL 20 MG/1
20 TABLET ORAL DAILY
Qty: 90 TABLET | Refills: 1 | Status: SHIPPED | OUTPATIENT
Start: 2024-01-28

## 2024-08-05 NOTE — TELEPHONE ENCOUNTER
Please review; protocol failed/ has no protocol      No active /future labs noted     Requested Prescriptions   Pending Prescriptions Disp Refills    LISINOPRIL 20 MG Oral Tab [Pharmacy Med Name: LISINOPRIL 20MG TABLETS] 90 tablet 1     Sig: TAKE 1 TABLET(20 MG) BY MOUTH DAILY       Hypertension Medications Protocol Failed - 7/31/2024  5:15 PM        Failed - CMP or BMP in past 12 months        Failed - EGFRCR or GFRNAA > 50     GFR Evaluation            Passed - Last BP reading less than 140/90     BP Readings from Last 1 Encounters:   11/14/23 128/80               Passed - In person appointment or virtual visit in the past 12 mos or appointment in next 3 mos     Recent Outpatient Visits              8 months ago Essential hypertension    [facility], Eyal Sheth MD    Office Visit    1 year ago Annual physical exam    [facility] Eyal Sheth MD    Office Visit    2 years ago Annual physical exam    [facility] Eyal Sheth MD    Office Visit    3 years ago Essential hypertension    [facility] Eyal Sheth MD    Office Visit    4 years ago Annual physical exam    [facility] Eyal Sheth MD    Office Visit                         Recent Outpatient Visits              8 months ago Essential hypertension    [facility], Eyal Sheth MD    Office Visit    1 year ago Annual physical exam    [facility] Eyal Sheth MD    Office Visit    2 years ago Annual physical exam    [facility] Eyal Sheth MD    Office Visit    3 years ago Essential hypertension    [facility] Eyal Sheth MD     Office Visit    4 years ago Annual physical exam    [facility] [address], [place] Eyal Marcum MD    Office Visit

## 2024-08-06 RX ORDER — LISINOPRIL 20 MG/1
20 TABLET ORAL DAILY
Qty: 90 TABLET | Refills: 0 | Status: SHIPPED | OUTPATIENT
Start: 2024-08-06

## 2024-08-26 ENCOUNTER — OFFICE VISIT (OUTPATIENT)
Dept: INTERNAL MEDICINE CLINIC | Facility: CLINIC | Age: 64
End: 2024-08-26
Payer: COMMERCIAL

## 2024-08-26 VITALS
DIASTOLIC BLOOD PRESSURE: 84 MMHG | HEIGHT: 74.5 IN | HEART RATE: 86 BPM | TEMPERATURE: 98 F | OXYGEN SATURATION: 97 % | BODY MASS INDEX: 31.39 KG/M2 | WEIGHT: 247.19 LBS | SYSTOLIC BLOOD PRESSURE: 136 MMHG

## 2024-08-26 DIAGNOSIS — I10 ESSENTIAL HYPERTENSION: ICD-10-CM

## 2024-08-26 DIAGNOSIS — Z87.19 HISTORY OF ULCERATIVE COLITIS: ICD-10-CM

## 2024-08-26 DIAGNOSIS — Z12.11 COLON CANCER SCREENING: ICD-10-CM

## 2024-08-26 DIAGNOSIS — Z12.5 PROSTATE CANCER SCREENING: ICD-10-CM

## 2024-08-26 DIAGNOSIS — E78.00 HYPERCHOLESTEREMIA: ICD-10-CM

## 2024-08-26 DIAGNOSIS — R73.01 IFG (IMPAIRED FASTING GLUCOSE): ICD-10-CM

## 2024-08-26 DIAGNOSIS — Z13.6 SCREENING FOR HEART DISEASE: ICD-10-CM

## 2024-08-26 DIAGNOSIS — Z00.00 ANNUAL PHYSICAL EXAM: Primary | ICD-10-CM

## 2024-08-26 NOTE — PROGRESS NOTES
Subjective:     Patient ID: Loc Brown is a 64 year old male.    Patient presents today for his annual physical.         History/Other:   Review of Systems   Constitutional: Negative.    HENT: Negative.     Eyes: Negative.    Respiratory: Negative.     Cardiovascular:  Negative for chest pain, palpitations and leg swelling.        No pnd   Neurological:  Negative for syncope.     Current Outpatient Medications   Medication Sig Dispense Refill    lisinopril 20 MG Oral Tab Take 1 tablet (20 mg total) by mouth daily. 90 tablet 0    Psyllium (METAMUCIL FIBER OR) Take by mouth as needed. Takes daily       Allergies:  Allergies   Allergen Reactions    Parabens RASH       Past Medical History:    Ulcerative colitis (HCC)    Unspecified essential hypertension    for 2 yrs      Past Surgical History:   Procedure Laterality Date    Colonoscopy      normal 2018 normal    Other surgical history      cervical spine fusion      Family History   Problem Relation Age of Onset    Other (Other) Father     Cancer Mother         pancreatic cancer    Hypertension Mother     Heart Disorder Brother     Other (Other) Brother         cirrhosis      Social History:   Social History     Socioeconomic History    Marital status:    Tobacco Use    Smoking status: Never     Passive exposure: Never    Smokeless tobacco: Never   Vaping Use    Vaping status: Never Used   Substance and Sexual Activity    Alcohol use: Yes     Alcohol/week: 14.0 - 16.0 standard drinks of alcohol     Types: 14 - 16 Glasses of wine per week     Comment: drinks wine    Drug use: No        Objective:   Physical Exam  Constitutional:       General: He is not in acute distress.     Appearance: He is well-developed. He is not ill-appearing, toxic-appearing or diaphoretic.   HENT:      Head: Normocephalic and atraumatic.      Right Ear: Tympanic membrane, ear canal and external ear normal.      Left Ear: Tympanic membrane, ear canal and external ear normal.       Nose: Nose normal.      Mouth/Throat:      Pharynx: No oropharyngeal exudate.   Eyes:      General: No scleral icterus.        Right eye: No discharge.         Left eye: No discharge.      Conjunctiva/sclera: Conjunctivae normal.      Pupils: Pupils are equal, round, and reactive to light.   Neck:      Thyroid: No thyromegaly.      Vascular: No JVD.   Cardiovascular:      Rate and Rhythm: Normal rate and regular rhythm.      Pulses: Normal pulses.      Heart sounds: Normal heart sounds. No murmur heard.     No friction rub. No gallop.   Pulmonary:      Effort: Pulmonary effort is normal. No respiratory distress.      Breath sounds: Normal breath sounds. No wheezing or rales.   Abdominal:      General: Abdomen is flat. Bowel sounds are normal. There is no distension.      Palpations: Abdomen is soft. There is no mass.      Tenderness: There is no abdominal tenderness. There is no guarding or rebound.   Genitourinary:     Rectum: Normal.      Comments: Prostate was symmetrical, mildy enlarged but soft smooth no nodules or indurations  Musculoskeletal:         General: Normal range of motion.      Cervical back: Normal range of motion and neck supple. No rigidity.      Right lower leg: No edema.      Left lower leg: No edema.   Lymphadenopathy:      Cervical: No cervical adenopathy.   Skin:     General: Skin is warm and dry.      Coloration: Skin is not jaundiced or pale.      Findings: No rash.   Neurological:      Mental Status: He is alert and oriented to person, place, and time.   Psychiatric:         Mood and Affect: Mood normal.         Assessment & Plan:   (Z00.00) Annual physical exam  (primary encounter diagnosis)  Plan: CBC With Differential With Platelet, Comp         Metabolic Panel (14), Hemoglobin A1C, PSA         Total, Screen, Lipid Panel, TSH W Reflex To         Free T4        Routine labs ordered; pt advised to get flushot, shingrix, and latest covid booster.     (I10) Essential hypertension  Plan: bp  higher today; pt told to monitor at home, cut down on sodium in diet, also cut down on wine drinking;  continue bp med.     (E78.00) Hypercholesteremia  Plan: check lipid pnane; ff low fat low cho ldiet .    (R73.01) IFG (impaired fasting glucose)  Plan: check fbg and A1c; watch carbs id diet.     (Z13.6) Screening for heart disease  Plan: pt again advivsed.    (Z12.11) Colon cancer screening  Plan: pt scheduled for his ccolonoscopy next week .    (Z12.5) Prostate cancer screening  Plan: check psa.     (Z87.19) History of ulcerative colitis  Plan: pt states  he was told by his GI that this had been in remission since previous colonoscopies showed no signs.  He will be doing another colonoscopy next week with Dr Nola HARRIS.        No orders of the defined types were placed in this encounter.      Meds This Visit:  Requested Prescriptions      No prescriptions requested or ordered in this encounter       Imaging & Referrals:  None

## 2024-11-11 RX ORDER — LISINOPRIL 20 MG/1
20 TABLET ORAL DAILY
Qty: 90 TABLET | Refills: 1 | Status: SHIPPED | OUTPATIENT
Start: 2024-11-11

## 2024-11-11 NOTE — TELEPHONE ENCOUNTER
Please review; protocol failed    Message sent to patient about lab work needing to be done from 8/26/24    No future appointments.  Last office visit: 8/26/2024    Requested Prescriptions   Pending Prescriptions Disp Refills    LISINOPRIL 20 MG Oral Tab [Pharmacy Med Name: LISINOPRIL 20MG TABLETS] 90 tablet 0     Sig: TAKE 1 TABLET(20 MG) BY MOUTH DAILY       Hypertension Medications Protocol Failed - 11/11/2024  3:43 PM        Failed - CMP or BMP in past 12 months        Failed - EGFRCR or GFRNAA > 50     GFR Evaluation            Passed - Last BP reading less than 140/90     BP Readings from Last 1 Encounters:   08/26/24 136/84               Passed - In person appointment or virtual visit in the past 12 mos or appointment in next 3 mos     Recent Outpatient Visits              2 months ago Annual physical exam    [facility] Eyal Sheth MD    Office [facility]    12 [facility]    1 year ago Annual physical exam    [facility] Eyal Sheth MD    Office Visit    2 years ago Annual physical exam    [facility] Eyal Sheth MD    Office Visit    3 years ago Essential hypertension    [facility] Eyal Sheth MD    Office Visit                             Recent Outpatient Visits              2 months ago Annual physical exam    [facility] Eyal Sheth MD    Office Visit    12 months ago Essential hypertension    [facility] Eyal Sheth MD    Office Visit    1 year ago Annual physical exam    [facility] Eyal Sheth MD    Office Visit    2 years ago Annual physical  exam    [facility], [address], Eyal Sheth MD    Office Visit    3 years ago Essential hypertension    [facility], [address], Eyal Sheth MD    Office Visit

## 2025-05-13 NOTE — TELEPHONE ENCOUNTER
Please review; protocol failed/No Protocol      Sent Text A [employer] message to patient to complete labs from 08/26/2024

## 2025-05-16 RX ORDER — LISINOPRIL 20 MG/1
20 TABLET ORAL DAILY
Qty: 90 TABLET | Refills: 0 | Status: SHIPPED | OUTPATIENT
Start: 2025-05-16

## (undated) NOTE — LETTER
09/13/21        [facility]      Dear Lissette Rm records indicate that you have outstanding lab work and or testing that was ordered for you and has not yet been completed:  Orders Placed This Encounter      CBC

## (undated) NOTE — LETTER
05/22/20    [facility] [place]      Dear Devon Canela records indicate that you have outstanding lab work and or testing that was ordered for you and has not yet been completed:  Orders Placed This Encounter      MALORIE W Dif

## (undated) NOTE — LETTER
7/27/2020              Gem Kim        [address] [address]         Dear Israel Avalos,    We have been unsuccessful trying to contact you by telephone.   Therefore, we are sending you this letter to relate Dr. Rhonda Rand

## (undated) NOTE — LETTER
01/16/20        Jeffrey Cortés      Dear Sammi Soto records indicate that you have outstanding lab work and or testing that was ordered for you and has not yet been completed:  Orders Placed This Encounter      CBC